# Patient Record
Sex: FEMALE | Race: WHITE | Employment: UNEMPLOYED | URBAN - METROPOLITAN AREA
[De-identification: names, ages, dates, MRNs, and addresses within clinical notes are randomized per-mention and may not be internally consistent; named-entity substitution may affect disease eponyms.]

---

## 2017-01-10 LAB
ANTIBODY SCREEN, EXTERNAL: NEGATIVE
CHLAMYDIA, EXTERNAL: NEGATIVE
HBSAG, EXTERNAL: NEGATIVE
HIV, EXTERNAL: NEGATIVE
N. GONORRHEA, EXTERNAL: NEGATIVE
RPR, EXTERNAL: NEGATIVE
RUBELLA, EXTERNAL: NORMAL
TYPE, ABO & RH, EXTERNAL: NORMAL

## 2017-07-06 LAB — GRBS, EXTERNAL: NEGATIVE

## 2017-08-08 ENCOUNTER — HOSPITAL ENCOUNTER (INPATIENT)
Age: 37
LOS: 2 days | Discharge: HOME | End: 2017-08-10
Attending: OBSTETRICS & GYNECOLOGY | Admitting: OBSTETRICS & GYNECOLOGY
Payer: COMMERCIAL

## 2017-08-08 PROBLEM — O09.523 ELDERLY MULTIGRAVIDA IN THIRD TRIMESTER: Status: ACTIVE | Noted: 2017-08-08

## 2017-08-08 LAB
ABO + RH BLD: NORMAL
BASOPHILS # BLD AUTO: 0 K/UL (ref 0–0.06)
BASOPHILS # BLD: 0 % (ref 0–2)
BLOOD GROUP ANTIBODIES SERPL: NORMAL
DIFFERENTIAL METHOD BLD: ABNORMAL
EOSINOPHIL # BLD: 0 K/UL (ref 0–0.4)
EOSINOPHIL NFR BLD: 0 % (ref 0–5)
ERYTHROCYTE [DISTWIDTH] IN BLOOD BY AUTOMATED COUNT: 13.8 % (ref 11.6–14.5)
HCT VFR BLD AUTO: 33.3 % (ref 35–45)
HGB BLD-MCNC: 11.2 G/DL (ref 12–16)
LYMPHOCYTES # BLD AUTO: 15 % (ref 21–52)
LYMPHOCYTES # BLD: 1.5 K/UL (ref 0.9–3.6)
MCH RBC QN AUTO: 29.7 PG (ref 24–34)
MCHC RBC AUTO-ENTMCNC: 33.6 G/DL (ref 31–37)
MCV RBC AUTO: 88.3 FL (ref 74–97)
MONOCYTES # BLD: 0.5 K/UL (ref 0.05–1.2)
MONOCYTES NFR BLD AUTO: 5 % (ref 3–10)
NEUTS SEG # BLD: 7.9 K/UL (ref 1.8–8)
NEUTS SEG NFR BLD AUTO: 80 % (ref 40–73)
PLATELET # BLD AUTO: 155 K/UL (ref 135–420)
PMV BLD AUTO: 13.6 FL (ref 9.2–11.8)
RBC # BLD AUTO: 3.77 M/UL (ref 4.2–5.3)
SPECIMEN EXP DATE BLD: NORMAL
WBC # BLD AUTO: 10 K/UL (ref 4.6–13.2)

## 2017-08-08 PROCEDURE — 74011250637 HC RX REV CODE- 250/637

## 2017-08-08 PROCEDURE — 0HQ9XZZ REPAIR PERINEUM SKIN, EXTERNAL APPROACH: ICD-10-PCS | Performed by: OBSTETRICS & GYNECOLOGY

## 2017-08-08 PROCEDURE — 10907ZC DRAINAGE OF AMNIOTIC FLUID, THERAPEUTIC FROM PRODUCTS OF CONCEPTION, VIA NATURAL OR ARTIFICIAL OPENING: ICD-10-PCS | Performed by: OBSTETRICS & GYNECOLOGY

## 2017-08-08 PROCEDURE — 86900 BLOOD TYPING SEROLOGIC ABO: CPT | Performed by: ADVANCED PRACTICE MIDWIFE

## 2017-08-08 PROCEDURE — 85025 COMPLETE CBC W/AUTO DIFF WBC: CPT | Performed by: ADVANCED PRACTICE MIDWIFE

## 2017-08-08 PROCEDURE — 74011000250 HC RX REV CODE- 250

## 2017-08-08 PROCEDURE — 65270000029 HC RM PRIVATE

## 2017-08-08 PROCEDURE — 74011250637 HC RX REV CODE- 250/637: Performed by: ADVANCED PRACTICE MIDWIFE

## 2017-08-08 RX ORDER — OXYTOCIN 10 [USP'U]/ML
INJECTION, SOLUTION INTRAMUSCULAR; INTRAVENOUS
Status: DISCONTINUED
Start: 2017-08-08 | End: 2017-08-08

## 2017-08-08 RX ORDER — SODIUM CHLORIDE 0.9 % (FLUSH) 0.9 %
5-10 SYRINGE (ML) INJECTION AS NEEDED
Status: DISCONTINUED | OUTPATIENT
Start: 2017-08-08 | End: 2017-08-08

## 2017-08-08 RX ORDER — IBUPROFEN 400 MG/1
800 TABLET ORAL
Status: DISCONTINUED | OUTPATIENT
Start: 2017-08-08 | End: 2017-08-10 | Stop reason: HOSPADM

## 2017-08-08 RX ORDER — MISOPROSTOL 200 UG/1
800 TABLET ORAL
Status: DISCONTINUED | OUTPATIENT
Start: 2017-08-08 | End: 2017-08-10 | Stop reason: HOSPADM

## 2017-08-08 RX ORDER — MISOPROSTOL 200 UG/1
TABLET ORAL
Status: DISCONTINUED
Start: 2017-08-08 | End: 2017-08-08

## 2017-08-08 RX ORDER — MISOPROSTOL 200 UG/1
800 TABLET ORAL
Status: DISCONTINUED | OUTPATIENT
Start: 2017-08-08 | End: 2017-08-08

## 2017-08-08 RX ORDER — PROMETHAZINE HYDROCHLORIDE 25 MG/ML
25 INJECTION, SOLUTION INTRAMUSCULAR; INTRAVENOUS
Status: DISCONTINUED | OUTPATIENT
Start: 2017-08-08 | End: 2017-08-10 | Stop reason: HOSPADM

## 2017-08-08 RX ORDER — ACETAMINOPHEN 325 MG/1
650 TABLET ORAL
Status: DISCONTINUED | OUTPATIENT
Start: 2017-08-08 | End: 2017-08-10 | Stop reason: HOSPADM

## 2017-08-08 RX ORDER — ACETAMINOPHEN 325 MG/1
325-650 TABLET ORAL
Status: DISCONTINUED | OUTPATIENT
Start: 2017-08-08 | End: 2017-08-08 | Stop reason: SDUPTHER

## 2017-08-08 RX ORDER — OXYTOCIN 10 [USP'U]/ML
10 INJECTION, SOLUTION INTRAMUSCULAR; INTRAVENOUS
Status: DISCONTINUED | OUTPATIENT
Start: 2017-08-08 | End: 2017-08-10 | Stop reason: HOSPADM

## 2017-08-08 RX ORDER — CASTOR OIL 100 %
OIL (ML) ORAL
Status: DISCONTINUED
Start: 2017-08-08 | End: 2017-08-08

## 2017-08-08 RX ORDER — OXYTOCIN 10 [USP'U]/ML
10 INJECTION, SOLUTION INTRAMUSCULAR; INTRAVENOUS
Status: DISCONTINUED | OUTPATIENT
Start: 2017-08-08 | End: 2017-08-08

## 2017-08-08 RX ORDER — LIDOCAINE HYDROCHLORIDE 10 MG/ML
INJECTION INFILTRATION; PERINEURAL
Status: DISCONTINUED
Start: 2017-08-08 | End: 2017-08-08

## 2017-08-08 RX ORDER — AMOXICILLIN 250 MG
1 CAPSULE ORAL
Status: DISCONTINUED | OUTPATIENT
Start: 2017-08-08 | End: 2017-08-10 | Stop reason: HOSPADM

## 2017-08-08 RX ORDER — SODIUM CHLORIDE 0.9 % (FLUSH) 0.9 %
5-10 SYRINGE (ML) INJECTION EVERY 8 HOURS
Status: DISCONTINUED | OUTPATIENT
Start: 2017-08-08 | End: 2017-08-08

## 2017-08-08 RX ORDER — LIDOCAINE HYDROCHLORIDE 10 MG/ML
20 INJECTION, SOLUTION EPIDURAL; INFILTRATION; INTRACAUDAL; PERINEURAL AS NEEDED
Status: DISCONTINUED | OUTPATIENT
Start: 2017-08-08 | End: 2017-08-08

## 2017-08-08 RX ORDER — CASTOR OIL 100 %
90 OIL (ML) ORAL ONCE
Status: DISCONTINUED | OUTPATIENT
Start: 2017-08-08 | End: 2017-08-08

## 2017-08-08 RX ORDER — ZOLPIDEM TARTRATE 5 MG/1
5 TABLET ORAL
Status: DISCONTINUED | OUTPATIENT
Start: 2017-08-08 | End: 2017-08-10 | Stop reason: HOSPADM

## 2017-08-08 RX ORDER — SENNOSIDES 8.6 MG/1
2 TABLET ORAL
Status: DISCONTINUED | OUTPATIENT
Start: 2017-08-08 | End: 2017-08-10 | Stop reason: HOSPADM

## 2017-08-08 RX ORDER — HYDROCORTISONE 10 MG/G
CREAM TOPICAL AS NEEDED
Status: DISCONTINUED | OUTPATIENT
Start: 2017-08-08 | End: 2017-08-08 | Stop reason: SDUPTHER

## 2017-08-08 RX ORDER — DOXYLAMINE SUCCINATE AND PYRIDOXINE HYDROCHLORIDE, DELAYED RELEASE TABLETS 10 MG/10 MG 10; 10 MG/1; MG/1
10 TABLET, DELAYED RELEASE ORAL
COMMUNITY

## 2017-08-08 RX ORDER — OXYCODONE AND ACETAMINOPHEN 5; 325 MG/1; MG/1
1-2 TABLET ORAL
Status: DISCONTINUED | OUTPATIENT
Start: 2017-08-08 | End: 2017-08-10 | Stop reason: HOSPADM

## 2017-08-08 RX ORDER — HYDROCORTISONE 25 MG/G
CREAM TOPICAL AS NEEDED
Status: DISCONTINUED | OUTPATIENT
Start: 2017-08-08 | End: 2017-08-10 | Stop reason: HOSPADM

## 2017-08-08 RX ADMIN — IBUPROFEN 800 MG: 400 TABLET ORAL at 15:50

## 2017-08-08 RX ADMIN — LIDOCAINE HYDROCHLORIDE 20 ML: 10 INJECTION, SOLUTION INFILTRATION; PERINEURAL at 14:59

## 2017-08-08 RX ADMIN — ACETAMINOPHEN 650 MG: 325 TABLET, FILM COATED ORAL at 21:03

## 2017-08-08 RX ADMIN — Medication 60 ML: at 14:50

## 2017-08-08 RX ADMIN — CASTOR OIL 60 ML: 100 LIQUID ORAL at 14:50

## 2017-08-08 NOTE — ROUTINE PROCESS
TRANSFER - IN REPORT:    Verbal report received from toño sanders rn(name) on Vero Ramon  being received from l rayo nicole(unit) for routine progression of care      Report consisted of patients Situation, Background, Assessment and   Recommendations(SBAR). Information from the following report(s) Kardex, Procedure Summary, Intake/Output, MAR and Recent Results was reviewed with the receiving nurse. Opportunity for questions and clarification was provided. Assessment completed upon patients arrival to unit and care assumed.

## 2017-08-08 NOTE — PROGRESS NOTES
1205: N. Syliva Shoe in room for cervical exam.  5/70/-3. Ultrasound brought into room to confirm baby vertex. Baby is in vertex position. 1305: Patient assisted into hands and knees with peanut ball.  at bedside for support. Room set for delivery. 1315: FHT are 144 via Doppler, Palpate strong contractions to every 2 min. 1357: N. Syliva Shoe in room for cervical exam and plan of care. 7/80/-2.     1358: Tub temp is 102.9, Ice buckets x 2 added to tub to cool it down. 1404: Tub temp is 100.1    1405: Patient in tub    1407: N. Syliva Shoe in room for labor. 1414: Doppler FHT is 140, movement audible on doppler. 1438: N. Syliva Shoe in room for delivery. 1440: Patient complete, pushing with Jayme De Leon CNM. 1442: Nursery called for delivery. 1444: Nursery arrived for delivery. 1452:  of VMI. Baby to maternal abdomen. Baby being tended to by nursery nurse. [phone]: Pediatrician called for  assistance. 1453: Cord clamped and cut.    1457: Spontaneous delivery of placenta. 1458: Perineum inspected by CNM. First degree laceration. CNM to repair. 1510: Radha-care done, Ice pack applied, Baby remains skin to skin for magic hour, side rails up and call light in reach, FOB at bedside. Patient instructed not to get for the first time without nurse at bedside and to call with increased bleeding. 1525: infant had dusky episode while adjusting mother for breastfeeding. Infant was crying at the time, Nursery called to bedside, observing infant while breastfeeding. 1540: Dietary called for tray. 1730: Pediatrician & Nursery RN at bedside discussing plan of care for infant. Patient wants to think about moving from midwifery to another room before moving.

## 2017-08-08 NOTE — PROGRESS NOTES
Patient ambulatory to unit with c/o contractions every 3 minutes since 1000 today. . 40 5/7 weeks. Denies leaking of vaginal fluids or bleeding. States positive fetal movement. Oriented to room and surroundings. Significant other and  Quentin Christian) supportive at bedside.

## 2017-08-08 NOTE — ROUTINE PROCESS
Bedside and Verbal shift change report given to Leoncio Santillan RN (oncoming nurse) by Greg Alberto RN (offgoing nurse). Report included the following information SBAR, Procedure Summary, Intake/Output, MAR and Recent Results.

## 2017-08-08 NOTE — L&D DELIVERY NOTE
Delivery Summary    Lacey Leyden in tub at 7 cms. Coping well but saying ' I can't do this\", about pain, moving in tub, out of tub. Asked where she wanted birth and she responded in bed. Assisted out of bed by RN and me,  with Lacey Leyden saying she couldn't move, and onto bed. SVE anterior lip. Reduced with 2 pushes. Pushed well, starting at 1440. FHR at 80, with recovery to 100. Only pushed another 3 pushes and baby born. Placed on mom's lower abdomen for stimulation since blue and limp. Short cord noted. Heart rate noted around 100. Cord clamped and cut immediately and handed to nursery nurse. Peds called. Baby with oxygen and stimulation. Dr Mauricio Bailey arrived baby with apgars of 2/5. Cord gases collected. Placenta delivered spont intact with 3 CV. EBL 300cc. First degree laceration repaired with 3-0 moncryl after lidocaine injected. Dr Ansley Portillo. [facility]    Patient: Clari Barraza MRN: [id number]  SSN: [id number]    YOB: 1980  Age: 40 y.o.   Sex: female        Labor Events:    Labor: No    Rupture Date: 2017    Rupture Time: 2:40 PM    Rupture Type AROM    Amniotic Fluid Volume:      Amniotic Fluid Description:         Induction: None        Augmentation: None;AROM    Labor Complications: None     Additional Complications:        Cervical Ripening:       None      Delivery Events:  Episiotomy: None    Laceration(s): First degree perineal      Repaired: Yes     Number of Repair Packets: 1    Suture Type and Size:    Monocryl 3.0 CT-1    Estimated Blood Loss (ml): 300        Information for the patient's :  Erasmo Eason [[id number]]     Delivery Summary - Baby    Delivery Date: 2017   Delivery Time: 2:52 PM   Delivery Type: Vaginal, Spontaneous Delivery  Sex:  male  Gestational Age: 39w6d  Delivery Clinician:  Nemo Malcolm  Living?: Living   Delivery Location: [address]  One minute Five minutes Ten minutes   Skin Color: 0    1       Heart Rate: 1 2         Reflex Irritability: 1   2         Muscle Tone: 0   2       Respiration: 0   2         Total: 2   9           Presentation: Vertex  Position: Right Occiput Anterior  Resuscitation Method:  Suctioning-tracheal;Tactile Stimulation; Oxygen     Meconium Stained: None    Cord Information: 3 Vessels   Complications: None  Cord Blood Sent?:  Yes    Blood Gases Sent?:  Yes    Placenta:  Date/Time:   2:57 PM  Removal: Spontaneous      Appearance: Normal     [place] Measurements:  Birth Weight:      Birth Length:     Head Circumference:       Chest Circumference:      Abdominal Girth:       Other Providers:   Gage BROWN;LUCAS CODY;AVIS QUIROZ;MARIALUISA BAHENA Midwife;Primary Nurse;Primary [place] Nurse;Pediatrician           Cord Blood Results:  Information for the patient's :  Ginna Thomson [[id number]]   No results found for: ABORH, PCTABR, PCTDIG, BILI, ABORHEXT, ABORH    Information for the patient's :  Ginna Thomson [[id number]]   No results found for: APH, APCO2, APO2, AHCO3, ABEC, ABDC, O2ST, SITE, [place], PHI, [place], PO2I, HCO3I, SO2I, IBD     Information for the patient's :  Ginna Thomson [[id number]]   No results found for: EPHV, PCO2V, PO2V, HCO3V, O2STV, EBDV

## 2017-08-08 NOTE — PROGRESS NOTES
Labor Progress Note  Patient seen, fetal heart rate and contraction pattern evaluated, patient examined. Lab Results  Component Value Date/Time   WBC 10.0 08/08/2017 12:42 PM   HGB 11.2 08/08/2017 12:42 PM   HCT 33.3 08/08/2017 12:42 PM   PLATELET 755 09/42/1542 12:42 PM   MCV 88.3 08/08/2017 12:42 PM         Physical Exam:  Cervical Exam:  7 cm dilated    80% effaced    -2 station    Membranes:  Intact  Uterine Activity: Frequency: Every 3 minutes  Fetal Heart Rate: Baseline: 150 per minute    Assessment/Plan:  Reassuring fetal status   Head with good descent.

## 2017-08-08 NOTE — H&P
Obstetric Admission History and Physical    Name: Lima Lr MRN: [id number] SSN: [id number]    YOB: 1980  Age: 40 y.o. Sex: female       Subjective:      Chief complaint:  contractions    Dede Carnes is a 40 y.o.  female, G 2 P 1 who presents at 40.5 weeks gestation with contractions. On admission EFM strip is obtained and is Category 1. She reports onset of symptoms at 0300. OB HISTORY  Prenatal care started at 11 wks with [address] . TVS supporting dates and viability. Problems of pregnancy include AMA- no testing performed . Total wt gain 18 lbs. GBS neg. PAST PREGNANCY HISTORY   41 wks 10 hrs F    [facility]    PAST MEDICAL / SURGICAL HISTORY  No past medical history on file. Problem List as of 2017  Date Reviewed: 2017          Codes Class Noted - Resolved    Elderly multigravida in third trimester ICD-10-CM: O09.523  ICD-9-CM: 659.63  2017 - Present        * (Principal)Labor and delivery indication for care or intervention ICD-10-CM: O75.9  ICD-9-CM: 659.90  2017 - Present        RESOLVED: Postpartum care following vaginal delivery ICD-10-CM: Z39.2  ICD-9-CM: V24.2  2014 - 2017              FAMILY/ SOCIAL HISTORY  Social History     Social History    Marital status:      Spouse name: N/A    Number of children: N/A    Years of education: N/A     Occupational History    Not on file. Social History Main Topics    Smoking status: Never Smoker    Smokeless tobacco: Not on file    Alcohol use No    Drug use: No    Sexual activity: Yes     Partners: Male     Other Topics Concern    Not on file     Social History Narrative          ALLERGY:  Allergies   Allergen Reactions    Milk Containing Products Diarrhea and Nausea and Vomiting    Onion Nausea and Vomiting       Review of Systems:  A comprehensive review of systems was negative      Objective:     VITAL SIGNS:  There were no vitals taken for this visit.     Physical Exam:  Abdomen: soft  Position of baby vtx  Estimated fetal weight 7 lbs  Contractions q 3-4 mins/mod quality  FHR baseline at  140 bpm/ variability mod/Category 1/accels  External Genitalia: normal general appearance without lesions  Cervix: Dilation: 5cm/ 70%/-3. Verified by sono as vertex  Membranes  intact    Assessment:     1) 40.5  weeks Intrauterine Pregnancy . 2) labor management      Plan:     Reassuring fetal status, Labor  Progressing normally, Continue plan for vaginal delivery   Head in pelvis but _3/ verified by sono as vertex.      Dr Jerri Slater MD  notified and aware of admission    Signed By:  Angie Payton CNM     August 8, 2017

## 2017-08-08 NOTE — IP AVS SNAPSHOT
Dionnaaiden Moreno 
 
 
 [address] [place] [address] Patient: Hayley Lomeli MRN: [id number] You are allergic to the following Allergen Reactions Milk Containing Products Diarrhea Nausea and Vomiting Onion Nausea and Vomiting Immunizations Administered for This Admission Name Date Tdap  Deferred (),  Deferred () Recent Documentation Height Weight Breastfeeding? BMI OB Status Smoking Status 1.6 m 83 kg Unknown 32.41 kg/m2 Recent pregnancy Never Smoker Emergency Contacts Name Discharge Info Relation Home Work Mobile [address] CAREGIVER [3] Spouse [3]   [phone] About your hospitalization You were admitted on:  August 8, 2017 You last received care in the:  [place] [address] You were discharged on:  August 10, 2017 Unit phone number:  [phone] Why you were hospitalized Your primary diagnosis was:  Labor And Delivery Indication For Care Or Intervention Your diagnoses also included:  Elderly Multigravida In Third Trimester Providers Seen During Your Hospitalizations Provider Role Specialty Primary office phone Barbarann Cheadle, MD Attending Provider Obstetrics & Gynecology [phone] Your Primary Care Physician (PCP) Primary Care Physician Office Phone Office Fax [facility] [phone] Follow-up Information Follow up With Details Comments Contact Info Amadeo Subramanian MD   [address] [place] [address] 83 [phone] Current Discharge Medication List  
  
CONTINUE these medications which have NOT CHANGED Dose & Instructions Dispensing Information Comments Morning Noon Evening Bedtime DICLEGIS 10-10 mg Tbec Generic drug:  doxylamine-pyridoxine Your last dose was:     
   
Your next dose is:    
   
   
 Dose:  10 mg  
 Take 10 mg by mouth nightly. Refills:  0  
     
   
   
   
  
 ferrous sulfate 325 mg (65 mg iron) tablet Commonly known as:  Iron Your last dose was: Your next dose is:    
   
   
 Dose:  325 mg Take 1 Tab by mouth Daily (before breakfast). Quantity:  30 Tab Refills:  1 PNV No.40-Iron Fum-FA Cmb No.1 27-1 mg Tab Your last dose was: Your next dose is: Take  by mouth. Refills:  0  
     
   
   
   
  
 senna-docusate 8.6-50 mg per tablet Commonly known as:  Everrett Jamison Your last dose was: Your next dose is:    
   
   
 Dose:  1 Tab Take 1 Tab by mouth two (2) times daily as needed for Constipation. Quantity:  30 Tab Refills:  0 Discharge Instructions CONGRATULATIONS ON THE BIRTH OF YOUR BABY! The first six weeks after childbirth is a time of physical and emotional adjustment. This handout will help to answer questions and provide guidance during the postpartum period. Every family's adjustment is unique, so please call if you have further concerns. At anytime we can be reached at [phone]. During office hours please ask to speak to a charge nurse. After hours, the answering service will take a message and the Nurse-Midwife on-call will return your call. If your question can wait until office hours: Monday-Friday 8:30-4:00, please do so. For emergencies or urgent concerns do not hesitate to call us after hours. DIET Your body is in need of a well-balanced, high protein diet to recuperate from birth. Please continue to take your prenatal vitamins for 6 weeks or as long as you are breastfeeding. Continue to drink at least 6-8 cups of water or other liquid a day. A breastfeeding mother also needs extra protein, calories and calcium containing foods.   It is a good rule to drink fluids with every feeding in order to maintain an adequate milk supply and avoid dehydration. Your baby will probably not be bothered by things in your diet, but if the baby seems extremely fussy or develops a rash, you may want to discuss possible food intolerances with your baby's care provider. PAIN MEDICATIONS Acetaminophen (Tylenol), ibuprofen (Motrin), or other prescribed pain medication may be taken as directed to relieve discomfort. The above medications pass in very minimal amounts into the breast milk and usually will not cause problems. There are medications that may affect the baby, so please consult your baby's care provider before taking medication. If you are breastfeeding, be sure to mention this to any care provider you see so that medications that are safe may be selected. There is an excellent resource called [employer] that is a resource for medication safety in pregnancy and lactation. You can visit their website at [employer]/ or call them toll free at [phone] if you have any questions about medication safety. UTERINE INVOLUTION / VAGINAL BLEEDING Involution is the process of the uterus returning to pre-pregnant size. It will take approximately six weeks for this process to occur. To achieve this size your uterus becomes firm to slow bleeding loss from the placental site. The first 7 days after birth, the bleeding is red and heavy. It may change with your activity and position. Some small clots are normal.   After ten days, the bleeding should be pale pink and slowed considerably. The next several weeks may progress to a pink, mucousy discharge. This may continue for 6-8 weeks, depending on your activity. During the first four weeks after delivery we recommend using sanitary pads instead of tampons. Douching should also be avoided, but it is fine to take a tub bath so long as the tub is very clean.  
 
 
ACTIVITY/EXERCISE 
 Adequate rest is essential to recovery. Try to rest or sleep when the baby sleeps. After two weeks, you may begin going for short walks, doing Kegel exercises and abdominal crunches. Avoid heavy, jarring or aerobic exercises. Remember to start out slowly and build up to your previous fitness level. Use common sense and don't overdo as rest is important and the benefits of increased rest are a quicker recovery. For the first two weeks after a  try to limit trips up or down steps. Do not lift anything heavier than the baby during this time. Lifting the baby or other objects should be done by bending at the knees rather than the waist.  Driving should be avoided during the first two to three weeks until you have the strength to push firmly on the brakes in case of an emergency. You may ride as a passenger, but DO wear a seat belt at all times. After a few weeks, you may resume normal activity at whatever pace is comfortable for you. Exercise may also be resumed gradually. Walking is a good way to start. Finally, try to be reasonable in your expectations. Caring for a new baby after major surgery can be quite trying. Arrange for assistance at home to ensure that you get enough rest.  
 
POSTPARTUM CHECK You may call the office when you return home to set up a postpartum visit. Most patients will be seen at 6 weeks after delivery, but after a  or other circumstances you may be seen in 2 weeks or less. If you are discharged from the hospital with staples that must be removed, you will be asked to come in sooner. At your postpartum visit, a pelvic exam may be performed. If you are having any problems or concerns, please do not hesitate to call. Once again our number is [phone]. MOOD CHANGES Significant hormonal changes occur in the days following delivery, and as a result, many women experience brief episodes of tearfulness or feeling \"blue. \"  These emotional swings may be made worse by lack of sleep and by the adjustments inherent in becoming a mother. For some women, these fluctuations are minor. For others, they are overwhelming; creating feelings of anxiety, depression, or the inability to cope. If you have difficulty functioning as a result of feeling down, or if the mood changes seem severe, do not improve, or result is thoughts of harming yourself or others CALL RIGHT AWAY. PERINEAL CARE The basic goals of perineal care are to prevent infection, to relieve pain and promote healing. Your stitches will dissolve in four to six weeks, and do not need to be removed. After urinating, please continue to clean with warm water from front to back. Please continue sitz baths as instructed twice a day for a week or as needed. Call the office if you see pus in the suture site, or have unusual or severe swelling or pain that seems to be getting worse. INCISION CARE If you had a , clean and dry the incision gently as you would the rest of your body. Washing over the area with soap and water, and showering are fine. If steri-strips are present they will gradually come off with time. Tub baths are permitted. You may experience numbness and burning in the area surrounding the incision which usually resolves gradually over the next several weeks or months. RETURN OF MENSTRUATION Your first menstrual period may occur as soon as four to six weeks after your delivery if you are not breast-feeding. If breast-feeding it is more difficult to predict when your first period will occur. Even if you are not yet menstruating, you may be ovulating and it may be possible to conceive again. It is common for your first period after childbirth to be very heavy with an increased amount of cramping. BREASTS Breast-feeding Mothers: Colostrum is excreted in the first 24-72 hours. Mature breast milk will appear on the 2nd to 5th day. Engorgement may occur with the mature milk making your breasts feel warm and very full. Frequent feedings will make you more comfortable. Babies do not nurse on regular schedules. Nursing every 1 1/2 to 2 hours is normal and frequent feeding DOES NOT mean you are not making enough milk. To avoid nipple confusion, do not give bottles for the first 4 weeks. Growth spurts are common and may require more frequent feedings. This is the way baby increases your milk supply. During a growth spurt, you may feel you are feeding very frequently and that your breasts are \"empty. \"  Don't worry, your milk is produced by supply and demand so this increased frequency of feeding will increase your milk supply within 48 hours. Sore nipples may occur with frequent feedings and are sometimes also caused by improper latch. Check for a proper latch. Baby should have a wide open mouth. Use different positions at each feeding if possible. Express a small amount of colostrum or breast milk onto the sore area and leave bra flaps unlatched until dry. The lactation consultant at [facility] is available for outpatient consultation without charge. Call [phone] from Monday-Friday 9:00am- 3:00pm to arrange an outpatient appointment with her. Local [facility] and consultants may also be very helpful. If You Are Not Breast-feeding: You will experience swelling, engorgement and some milk production. There are no safe medications available to stop lactation. Some remedies for engorgement include: wearing a tight bra, ice packs and cold green cabbage leaves placed between the breast and your bra. Change these frequently. Tylenol or Motrin should help with the discomfort. SEXUAL ADJUSTMENTS We recommend that you wait at least four weeks before resuming sexual intercourse.   A sore perineum, a demanding baby and fatigue will certainly affect your ability to enjoy lovemaking! A vaginal lubricant is recommended to help with any dryness. It is very important to remember that you will ovulate BEFORE your first period and can conceive. If you do not wish another pregnancy right away, please take precautions to avoid pregnancy. If you would like a prescription method of birth control, please discuss this with us at your 6 week visit. ELIMINATION We remind all postpartum patients that it may take a few days for your bowels to return to normal, especially if you had a long labor. For those who had C-sections or severe lacerations, we recommend that you use a stool softener twice daily for at least two weeks. Many stool softeners are over-the-counter. Colace (Docusate Sodium) is recommended. Bulk forming agents such as Metamucil or Fibercon may be used daily in addition to a stool softener to promote regular bowel movements. Eating fresh fruits and vegetables along with whole grains is helpful as well. Do not be afraid to have a bowel movement as your stitches will not \"come out\" in the course of having a bowel movement. Urination may be difficult due to soreness around the urethra, or as an after effect of epidural.  This is temporary and can be helped  by squirting water over the perineum or try going in the shower. Hemorrhoids are common after birth. Tucks pads, Anusol cream and avoiding constipation are helpful. If constipation does occur, you may take Milk of Magnesia or Senekot according to the package instructions. DANGER SIGNS! CALL WITHOUT DELAY IF YOU ARE EXPERIENCING ANY OF THE FOLLOWING: 
* Unusually heavy bleeding, soaking more than 1 or more pads in an hour. * Vaginal discharge with strong foul odor. * Fever of 101 or higher * Unusual pain or tenderness in the abdominal area. * If breasts are red, hot or have a painful lump. * Depression that persists longer than 1-2 weeks or is severe. * Any urinary frequency accompanied by urgency or pain. * A lump in leg or calf especially if painful, warm or red. We thank you for choosing us for your prenatal care and/or delivery. We wish you all happiness and health with your baby for his or her lifetime! Bud Lopez MD 
 
Discharge Instructions Attachments/References SAFE SLEEP AND SUDDEN INFANT DEATH SYNDROME (SIDS): PEDIATRIC: GENERAL INFO (ENGLISH) SHAKEN BABY SYNDROME: PEDIATRIC (ENGLISH) DEPRESSION: POSTPARTUM (ENGLISH) PARENTING: STRESS AND INFANTS (ENGLISH) Discharge Orders None [facility] Announce[facility] We are excited to announce that we are making your provider's discharge notes available to you in [employer]. You will see these notes when they are completed and signed by the physician that discharged you from your recent hospital stay. If you have any questions or concerns about any information you see in [employer], please call the Health Information Department where you were seen or reach out to your Primary Care Provider for more information about your plan of care. Introducing [facility]! Philip Ba introduces [employer] patient portal. Now you can access parts of your medical record, email your doctor's office, and request medication refills online. 1. In your internet browser, go to [email or web address] 2. Click on the First Time User? Click Here link in the Sign In box. You will see the New Member Sign Up page. 3. Enter your [employer] Access Code exactly as it appears below. You will not need to use this code after youve completed the sign-up process. If you do not sign up before the expiration date, you must request a new code. · [employer] Access Code: [email or web address] Expires: 10/29/2017  9:57 AM 
 
4. Enter the last four digits of your Social Security Number (xxxx) and Date of Birth (mm/dd/yyyy) as indicated and click Submit. You will be taken to the next sign-up page. 5. Create a [employer] ID. This will be your [employer] login ID and cannot be changed, so think of one that is secure and easy to remember. 6. Create a [employer] password. You can change your password at any time. 7. Enter your Password Reset Question and Answer. This can be used at a later time if you forget your password. 8. Enter your e-mail address. You will receive e-mail notification when new information is available in [address] 9. Click Sign Up. You can now view and download portions of your medical record. 10. Click the Download Summary menu link to download a portable copy of your medical information. If you have questions, please visit the Frequently Asked Questions section of the [employer] website. Remember, [employer] is NOT to be used for urgent needs. For medical emergencies, dial 911. Now available from your iPhone and Android! General Information Please provide this summary of care documentation to your next provider. Patient Signature:  ____________________________________________________________ Date:  ____________________________________________________________  
  
Mauricio Duarte Provider Signature:  ____________________________________________________________ Date:  ____________________________________________________________ More Information Learning About Safe Sleep for Babies Why is safe sleep important? Enjoy your time with your baby, and know that you can do a few things to keep your baby safe. Following safe sleep guidelines can help prevent sudden infant death syndrome (SIDS) and reduce other sleep-related risks. SIDS is the death of a baby younger than 1 year with no known cause. Talk about these safety steps with your  providers, family, friends, and anyone else who spends time with your baby. Explain in detail what you expect them to do.  Do not assume that people who care for your baby know these guidelines. What are the tips for safe sleep? Putting your baby to sleep · Put your baby to sleep on his or her back, not on the side or tummy. This reduces the risk of SIDS. · Once your baby learns to roll from the back to the belly, you do not need to keep shifting your baby onto his or her back. But keep putting your baby down to sleep on his or her back. · Keep the room at a comfortable temperature so that your baby can sleep in lightweight clothes without a blanket. Usually, the temperature is about right if an adult can wear a long-sleeved T-shirt and pants without feeling cold. Make sure that your baby doesn't get too warm. Your baby is likely too warm if he or she sweats or tosses and turns a lot. · Consider offering your baby a pacifier at nap time and bedtime if your doctor agrees. · The American Academy of Pediatrics recommends that you do not sleep with your baby in the bed with you. · When your baby is awake and someone is watching, allow your baby to spend some time on his or her belly. This helps your baby get strong and may help prevent flat spots on the back of the head. Cribs, cradles, bassinets, and bedding · For the first 6 months, have your baby sleep in a crib, cradle, or bassinet in the same room where you sleep. · Keep soft items and loose bedding out of the crib. Items such as blankets, stuffed animals, toys, and pillows could block your baby's mouth or trap your baby. Dress your baby in sleepers instead of using blankets. · Make sure that your baby's crib has a firm mattress (with a fitted sheet). Don't use bumper pads or other products that attach to crib slats or sides. They could block your baby's mouth or trap your baby. · Do not place your baby in a car seat, sling, swing, bouncer, or stroller to sleep. The safest place for a baby is in a crib, cradle, or bassinet that meets safety standards. What else is important to know? More about sudden infant death syndrome (SIDS) SIDS is very rare. In most cases, a parent or other caregiver puts the babywho seems healthydown to sleep and returns later to find that the baby has . No one is at fault when a baby dies of SIDS. A SIDS death cannot be predicted, and in many cases it cannot be prevented. Doctors do not know what causes SIDS. It seems to happen more often in premature and low-birth-weight babies. It also is seen more often in babies whose mothers did not get medical care during the pregnancy and in babies whose mothers smoke. Do not smoke or let anyone else smoke in the house or around your baby. Exposure to smoke increases the risk of SIDS. If you need help quitting, talk to your doctor about stop-smoking programs and medicines. These can increase your chances of quitting for good. Breastfeeding your child may help prevent SIDS. Be wary of products that are billed as helping prevent SIDS. Talk to your doctor before buying any product that claims to reduce SIDS risk. What to do while still pregnant · See your doctor regularly. Women who see a doctor early in and throughout their pregnancies are less likely to have babies who die of SIDS. · Eat a healthy, balanced diet, which can help prevent a premature baby or a baby with a low birth weight. · Do not smoke or let anyone else smoke in the house or around you. Smoking or exposure to smoke during pregnancy increases the risk of SIDS. If you need help quitting, talk to your doctor about stop-smoking programs and medicines. These can increase your chances of quitting for good. · Do not drink alcohol or take illegal drugs. Alcohol or drug use may cause your baby to be born early. Follow-up care is a key part of your child's treatment and safety. Be sure to make and go to all appointments, and call your doctor if your child is having problems.  It's also a good idea to know your child's test results and keep a list of the medicines your child takes. Where can you learn more? Go to http://ronald-jossue.info/. Enter P621 in the search box to learn more about \"Learning About Safe Sleep for Babies. \" Current as of: May 4, 2017 Content Version: 11.3 © 6584-1757 [employer]. Care instructions adapted under license by [employer] (which disclaims liability or warranty for this information). If you have questions about a medical condition or this instruction, always ask your healthcare professional. [place] [address] any warranty or liability for your use of this information. Shaken Baby Syndrome: Care Instructions Your Care Instructions If you want to save this information but don't think it is safe to take it home, see if a trusted friend can keep it for you. Plan ahead. Know who you can call for help, and memorize the phone number. Be careful online too. Your online activity may be seen by others. Do not use your personal computer or device to read about this topic. Use a safe computer such as one at work, a friend's house, or a [employer]. There is a big difference between normal play activities and violent movements that harm a child. Bouncing a child on a knee or gently tossing a child in the air does not cause shaken baby syndrome. Shaken baby syndrome is brain damage that occurs when a baby is shaken or is slammed or thrown against an object. It is a form of child abuse that occurs when the baby's caregiver loses control. Shaking a baby or striking a baby's head can cause bruising and bleeding to the brain. Caring for a baby can be trying at times. You may have periods of feeling overwhelmed, especially if your baby is crying. Many babies cry from 1 to 5 hours out of every 24 hours during the first few months of life. Some babies cry more.  You can learn ways to help stay in control of your emotions when you feel stressed. Then you can be with your baby in a loving and healthy way. Follow-up care is a key part of your child's treatment and safety. Be sure to make and go to all appointments, and call your doctor if your child is having problems. It's also a good idea to know your child's test results and keep a list of the medicines your child takes. How can you care for your child at home? · Take steps to protect yourself from being stressed. ¨ Learn about how children develop so that you will understand why your child behaves as he or she does. Talk to your doctor about parent education classes or books. ¨ Talk with other parents about the ways they cope with the demands of parenting. ¨ Ask for help when you need time for yourself. ¨ Take short breaks and naps whenever you can. · If your baby cries a lot, try these ways to take care of his or her needs or to remove yourself safely. ¨ Check to see if your baby is hungry or has a dirty diaper. ¨ Hold your baby to your chest while you take and release deep breaths. ¨ Swing, rock, or walk with your baby. Some babies love to be taken for car rides or stroller walks. ¨ Tell stories and sing songs to your baby, who loves to hear your voice. ¨ Let your baby cry alone for a few minutes if his or her needs are taken care of and he or she is in a safe place, such as a crib. Remove yourself to another room where you can breathe calmly and try to clear your head. Count to 10 with each breath. ¨ Talk to your doctor if your baby continues to cry for what seems to be no reason. · Try some steps for relieving stress in your life. There are self-help books and classes on yoga, relaxation techniques, and other ways to relieve stress. Counseling and anger management training help many parents adjust to new pressures. · Never shake a baby. Never slap or hit a baby. · Take steps to protect your child from abuse by others. ¨ Screen your potential  providers to find out their backgrounds and attitudes about . ¨ If you suspect child abuse and the child is not in immediate danger, contact your local child protection services or police. ¨ Do not confront someone who you suspect is a child abuser. This may cause more harm to the child. ¨ If you are concerned about a child's well-being, call the [facility] hotline at 8-974-1-A-CHILD (0-618.168.2357). When should you call for help? Call 911 anytime you think a child may need emergency care. For example, call if: · A child is unconscious or is having trouble breathing. · A baby has been shaken. It is extremely important that a shaken baby gets medical care right away. Call your doctor now or seek immediate medical care if: 
· You are concerned that you cannot control your actions around your child. · You are concerned that a child's caregiver cannot control his or her actions around a child. Watch closely for changes in your child's health, and be sure to contact your doctor if your child has any problems. Where can you learn more? Go to http://ronald[place]terling Heights Dentistjossue.info/. Enter H891 in the search box to learn more about \"Shaken Baby Syndrome: Care Instructions. \" Current as of: July 26, 2016 Content Version: 11.3 © 0631-3716 "[employer]. Care instructions adapted under license by [employer] (which disclaims liability or warranty for this information). If you have questions about a medical condition or this instruction, always ask your healthcare professional. [place] [address] any warranty or liability for your use of this information. Depression After Childbirth: Care Instructions Your Care Instructions Many women get the \"baby blues\" during the first few days after childbirth. You may lose sleep, feel irritable, and cry easily.  You may feel happy one minute and sad the next. Hormone changes are one cause of these emotional changes. Also, the demands of a new baby, along with visits from relatives or other family needs, add to a mother's stress. The \"baby blues\" often peak around the fourth day. Then they ease up in less than 2 weeks. If your moodiness or anxiety lasts for more than 2 weeks, or if you feel like life is not worth living, you may have postpartum depression. This is different for each mother. Some mothers with serious depression may worry intensely about their infant's well-being. Others may feel distant from their child. Some mothers might even feel that they might harm their baby. A mother may have signs of paranoia, wondering if someone is watching her. Depression is not a sign of weakness. It is a medical condition that requires treatment. Medicine and counseling often work well to reduce depression. Talk to your doctor about taking antidepressant medicine while breastfeeding. Follow-up care is a key part of your treatment and safety. Be sure to make and go to all appointments, and call your doctor if you are having problems. It's also a good idea to know your test results and keep a list of the medicines you take. How do you know if you are depressed? With all the changes in your life, you may not know if you are depressed. Pregnancy sometimes causes changes in how you feel that are similar to the symptoms of depression. Symptoms of depression include: · Feeling sad or hopeless and losing interest in daily activities. These are the most common symptoms of depression. · Sleeping too much or not enough. · Feeling tired. You may feel as if you have no energy. · Eating too much or too little. · Writing or talking about death, such as writing suicide notes or talking about guns, knives, or pills.  Keep the numbers for these national suicide hotlines: 2-882-309-TALK (8-135.605.4646) and 9-602-NLXMGLM (3-351.265.8199). If you or someone you know talks about suicide or feeling hopeless, get help right away. How can you care for yourself at home? · Be safe with medicines. Take your medicines exactly as prescribed. Call your doctor if you think you are having a problem with your medicine. · Eat a healthy diet so that you can keep up your energy. · Get regular daily exercise, such as walks, to help improve your mood. · Get as much sunlight as possible. Keep your shades and curtains open. Get outside as much as you can. · Avoid using alcohol or other substances to feel better. · Get as much rest and sleep as possible. Avoid doing too much. Being too tired can increase depression. · Play stimulating music throughout your day and soothing music at night. · Schedule outings and visits with friends and family. Ask them to call you regularly, so that you do not feel alone. · Ask for help with preparing food and other daily tasks. Family and friends are often happy to help a mother with a . · Be honest with yourself and those who care about you. Tell them about your struggle. · Join a support group of new mothers. No one can better understand the challenges of caring for a  than other new mothers. · If you feel like life is not worth living or are feeling hopeless, get help right away. Keep the numbers for these national suicide hotlines: 4-587-942-TALK (8-605.433.2317) and 3-076-BPHTOPG (5-606.305.6162). When should you call for help? Call 911 anytime you think you may need emergency care. For example, call if: 
· You feel you cannot stop from hurting yourself, your baby, or someone else. Call your doctor now or seek immediate medical care if: 
· You are having trouble caring for yourself or your baby. · You hear voices. Watch closely for changes in your health, and be sure to contact your doctor if: 
· You have problems with your depression medicine. · You do not get better as expected. Where can you learn more? Go to http://ronald-jossue.info/. Enter U447 in the search box to learn more about \"Depression After Childbirth: Care Instructions. \" Current as of: July 26, 2016 Content Version: 11.3 © 3575-1574 [employer]. Care instructions adapted under license by [employer] (which disclaims liability or warranty for this information). If you have questions about a medical condition or this instruction, always ask your healthcare professional. [address] any warranty or liability for your use of this information. Stress in Parents of Infants: Care Instructions Your Care Instructions Meeting the increased demands of being a new parent can be a big challenge. It is easy to get overtired and overwhelmed during the first weeks. What used to be a simple chore, such as buying groceries, is not so simple now. Plus, you have new chores, including feeding and changing your new baby. At the end of the day, you may be so tired that you feel like crying. Instead of looking forward to the next day, you may be dreading tomorrow. Like many new parents, you are burned out from the stress of having a new baby. Stress affects each of us differently, and the most effective ways to relieve it are different for each person. You can try different methods to find out which ones work best for you. As the weeks go by, you will begin to develop a rhythm with your baby. Tasks that now seem to take forever will become easier. Many women get the \"baby blues\" during the first few days after childbirth. If you are a new mother and the \"baby blues\" last more than a few days, call your doctor right away. Depression is a medical condition that requires treatment. Follow-up care is a key part of your treatment and safety.  Be sure to make and go to all appointments, and call your doctor if you are having problems. It's also a good idea to know your test results and keep a list of the medicines you take. How can you care for yourself at home? · Be kind to yourself. Your new baby takes a lot of work, but he or she can give you a lot of pleasure too. Do not worry about housekeeping for a while. · Allow your friends to bring you meals or do chores. · Limit visitors to as few as you feel you can handle, or ask them not to visit for a while. Before they come, set a limit on how long they will stay. · Sleep when your baby sleeps. Even a short nap helps. · Find what triggers your stress, and avoid those things as much as you can. · If you breastfeed, learn how to collect and store some breast milk so your partner or  can feed the baby while you sleep. · Eat a balanced diet so you can keep up your energy. · Drink plenty of fluids throughout the day. · Avoid caffeine and alcohol. Caffeine is found in coffee, tea, cola drinks, chocolate, and other foods. · Limit medicines that can make you more tired, such as tranquilizers and cold and allergy medicines. · Get regular daily exercise, such as walks, to help improve your mood. Rest after you exercise. · Be honest with yourself and those who care about you. Tell them you are stressed and tired. · Talking to other new parents can help. Ask your doctor or child's doctor to suggest support groups for new parents. Hearing that someone else is having the same experiences you are can help a lot. · If you have the baby blues for more than a few days, call your doctor right away. When should you call for help? Call 911 anytime you think you may need emergency care. For example, call if: 
· You have thoughts of hurting yourself, your baby, or another person. Call your doctor now or seek immediate medical care if: 
· You are having trouble caring for yourself or your baby.  
Watch closely for changes in your health, and be sure to contact your doctor if you have any problems. Where can you learn more? Go to http://ronald-jossue.info/. Enter H142 in the search box to learn more about \"Stress in Parents of Infants: Care Instructions. \" Current as of: July 26, 2016 Content Version: 11.3 © 8625-5995 [employer]. Care instructions adapted under license by [employer]_ (which disclaims liability or warranty for this information). If you have questions about a medical condition or this instruction, always ask your healthcare professional. [address] any warranty or liability for your use of this information.

## 2017-08-09 LAB
HCT VFR BLD AUTO: 31.7 % (ref 35–45)
HGB BLD-MCNC: 10.5 G/DL (ref 12–16)

## 2017-08-09 PROCEDURE — 74011250637 HC RX REV CODE- 250/637: Performed by: ADVANCED PRACTICE MIDWIFE

## 2017-08-09 PROCEDURE — 85018 HEMOGLOBIN: CPT | Performed by: ADVANCED PRACTICE MIDWIFE

## 2017-08-09 PROCEDURE — 65270000029 HC RM PRIVATE

## 2017-08-09 PROCEDURE — 36415 COLL VENOUS BLD VENIPUNCTURE: CPT | Performed by: ADVANCED PRACTICE MIDWIFE

## 2017-08-09 RX ADMIN — IBUPROFEN 800 MG: 400 TABLET ORAL at 08:57

## 2017-08-09 RX ADMIN — IBUPROFEN 800 MG: 400 TABLET ORAL at 16:57

## 2017-08-09 RX ADMIN — ACETAMINOPHEN 650 MG: 325 TABLET, FILM COATED ORAL at 06:19

## 2017-08-09 RX ADMIN — ACETAMINOPHEN 650 MG: 325 TABLET, FILM COATED ORAL at 16:05

## 2017-08-09 RX ADMIN — ACETAMINOPHEN 650 MG: 325 TABLET, FILM COATED ORAL at 02:27

## 2017-08-09 RX ADMIN — ACETAMINOPHEN 650 MG: 325 TABLET, FILM COATED ORAL at 12:05

## 2017-08-09 RX ADMIN — IBUPROFEN 800 MG: 400 TABLET ORAL at 00:05

## 2017-08-09 RX ADMIN — ACETAMINOPHEN 650 MG: 325 TABLET, FILM COATED ORAL at 20:08

## 2017-08-09 NOTE — PROGRESS NOTES
Mom doing well VS WNL up and moving around, tolerating diet well. Mom continuing to do well pain managed well with medicine, bonding with baby.

## 2017-08-09 NOTE — PROGRESS NOTES
Patient doing well. Ambulating without complaints. Voiding without problems. Pain managed well with motrin and tylenol. Bonding well with baby.

## 2017-08-09 NOTE — ROUTINE PROCESS
Verbal shift change report given to Delia Garcia RN (oncoming nurse) by Alphonso Johnson RN (offgoing nurse). Report included the following information SBAR, Kardex, Intake/Output, MAR and Recent Results.

## 2017-08-09 NOTE — PROGRESS NOTES
Verbal shift change report given to Zhane Hernandez RN (oncoming nurse) by Hermes Gee (offgoing nurse). Report included the following information SBAR, Kardex, Intake/Output, MAR and Recent Results.

## 2017-08-09 NOTE — PROGRESS NOTES
PPD # 1    Patient doing well post-partum without significant complaint. Voiding without difficulty, normal lochia. Breastfeeding well. Baby stable but spent the night in nursery to observe for apneic spells    Vitals:  No data found. Temp (24hrs), Av.9 °F (36.6 °C), Min:97.3 °F (36.3 °C), Max:98.4 °F (36.9 °C)      Vital signs stable, afebrile. Exam:  Patient without distress. Breasts intact and nontender               Abdomen soft, fundus firm at level of umbilicus, nontender               Perineum with normal lochia noted. Lower extremities are negative for swelling, cords or tenderness. Lab/Data Review:  CBC:   Lab Results   Component Value Date/Time    WBC 10.0 2017 12:42 PM    HGB 10.5 (L) 2017 06:32 AM    HCT 31.7 (L) 2017 06:32 AM     2017 12:42 PM       Assessment and Plan:  Patient appears to be having uncomplicated post-partum course. Continue routine perineal care and maternal education. Plan discharge tomorrow if no problems occur.     Tameka Roberts CNM  2017  8:49 AM

## 2017-08-10 VITALS
WEIGHT: 182.98 LBS | RESPIRATION RATE: 18 BRPM | TEMPERATURE: 98 F | OXYGEN SATURATION: 99 % | DIASTOLIC BLOOD PRESSURE: 68 MMHG | BODY MASS INDEX: 32.42 KG/M2 | HEIGHT: 63 IN | SYSTOLIC BLOOD PRESSURE: 108 MMHG | HEART RATE: 68 BPM

## 2017-08-10 PROCEDURE — 75410000000 HC DELIVERY VAGINAL/SINGLE

## 2017-08-10 PROCEDURE — 75410000002 HC LABOR FEE PER 1 HR

## 2017-08-10 PROCEDURE — 75410000003 HC RECOV DEL/VAG/CSECN EA 0.5 HR

## 2017-08-10 PROCEDURE — 74011250637 HC RX REV CODE- 250/637: Performed by: ADVANCED PRACTICE MIDWIFE

## 2017-08-10 RX ADMIN — ACETAMINOPHEN 650 MG: 325 TABLET, FILM COATED ORAL at 09:39

## 2017-08-10 RX ADMIN — IBUPROFEN 800 MG: 400 TABLET ORAL at 12:35

## 2017-08-10 RX ADMIN — IBUPROFEN 800 MG: 400 TABLET ORAL at 03:39

## 2017-08-10 RX ADMIN — ACETAMINOPHEN 650 MG: 325 TABLET, FILM COATED ORAL at 00:03

## 2017-08-10 RX ADMIN — ACETAMINOPHEN 650 MG: 325 TABLET, FILM COATED ORAL at 04:16

## 2017-08-10 NOTE — LACTATION NOTE
Mother states baby is continuing to nurse well. No questions/concerns. Encouraged to call if needed.

## 2017-08-10 NOTE — DISCHARGE INSTRUCTIONS

## 2017-08-10 NOTE — ROUTINE PROCESS
Bedside and Verbal shift change report given to Sammy [address] (oncoming nurse) by [facility] RN   (offgoing nurse). Report included the following information SBAR, Procedure Summary, Intake/Output, MAR and Recent Results.

## 2017-08-10 NOTE — PROGRESS NOTES
~~ 0730 ASSUME CARE OF PT SITTING UP IN BED, SR UP X2, CB IN REACH, BABY SLEEPING IN CRIB, FOB SLEEPING- PT DENIES NEED FOR PAIN MED, INCREASED BLEEDING, NAUSEA, ITCHING OR DIFF VOIDING-  ASSESSMENT AS CHARTED. POC REVIEWED- WILL D/C THIS AM AFTER BABY HAS CIRC-    PT OFFERS NO C/O OR REQ    ~~0800 REG DIET BREAKFAST TRAY SERVED    ~~ 0815 BABY TO PROCEDURE ROOM FOR LISSETTE, DR WRIGHT HERE- REVIEWED  APPROX LENGTH OF SEPARATION.      ~~0925 BABY BACK TO ROOM SLEEPING SOUNDLY- CIRC TEACHING DEFERRED. REVIEWED THAT BABY NOT WANT TO NURSE FOR A WILE AFTER CIRC- PT INSTRUCTED NOT TO OPEN DIAPER W/O RN -- UNDERSTANDS. PT VOICES DESIRES FOR HOSP PICTURES    ~~0939 TYLENOL GIVEN FOR PAIN OF 3/10    ~~ 1015 PT GIVEN SAMPLE BAG & WRITTEN D/C INSTRUCTIONS FOR HER TO REVIEW--      ~~1030 PAN MED EFFECTIVE- PT GIVEN WRITTEN D/C INSTRUCTIONS FOR BABY TO READ    ~~1045 HUGS TAG REMOVED. BRACELETS CHECKED & FOOT PRINT SHEET SIGNED- PT HAS READ D/C INSTRUCTIONS-- ALL ? S ANSWERED. FOB ASKING IF PT CAN HAVE LUNCH BEFORE LEAVING-- HE ACTS AS LIKE THIS IS VERY IMPORTANT--  TOLD THAT IS FINE--    ~~1105 HOSP PHOTOG IN DOING PICTURES-      ~~1140  IN--    ~~1200 REG DIET LUNCH TRAY SERVED    ~~1230 CIRC TEACHING DONE W/ PT & FOB- D/C TEACHING DONE W/ PT & FOB. BOTH RECEPTIVE TO INFO. ALL ? S ANSWERED.     ~~1245 FUSSY BABY TO THE BREAST- GOOD LATCH & SUCK OBSERVED.     ~~1255 TEACHING COMPLETE--  PT FINISHING BIRTH CERT PAPER WORK- TOLD TO CALL FOR W/C WHEN READY TO EXIT--    ~~1330 PT & FOB STILL ORGANIZING TO LEAVE--    ~~1415 BABY INTO CAR SEAT BY PT/FOB.  PT D/C'D HOME VIA W/C IN GOOD COND W/O C/O OR REQ VOICED

## 2017-08-10 NOTE — DISCHARGE SUMMARY
Obstetrical Discharge Summary       RIANA Pascale Villalobos MD  [address] [facility]  [address] [place] [address] [phone]        Patient ID:  Jenny Isabel  [id number]  32 y.o.  1980    Admit Date: 2017    Discharge Date: 8/10/2017     Admitting Physician: Jeni Trinidad MD     Admission Diagnoses: maternity  Labor and delivery indication for care or intervention    Discharge Diagnoses: same as above      Additional Diagnoses:Present on Admission:   Elderly multigravida in third trimester   Labor and delivery indication for care or intervention       Historical Additional  Problem List.: Problem List as of 8/10/2017  Date Reviewed: 2017          Codes Class Noted - Resolved    Elderly multigravida in third trimester ICD-10-CM: O09.523  ICD-9-CM: 659.63  2017 - Present        * (Principal)Labor and delivery indication for care or intervention ICD-10-CM: O75.9  ICD-9-CM: 659.90  2017 - Present        RESOLVED: Postpartum care following vaginal delivery ICD-10-CM: Z39.2  ICD-9-CM: V24.2  2014 - 2017               Baby link  Information for the patient's :  Jcarlos Sexton [[id number]]     Delivery Type: Vaginal, Spontaneous Delivery   Delivery Date: 2017   Delivery Time: 2:52 PM     Birth Weight: 3.37 kg     Sex:  male  Delivery Clinician:  Janee Pemberton   Gestational Age: Gestational Age: 39w6d    Presentation: Vertex   Position: Right  Occiput  Anterior     Apgars were 2  and 9      Resuscitation Method: Tactile Stimulation; Oxygen;PPV;Suctioning-deep     Meconium Stained: None  Living Status: Living       Placenta Date/Time:   2:57 PM   Placenta Removal: Spontaneous   Placenta Appearance: Vertex [1]     Cord Information: 3 Vessels    Cord Events: None       Cord Blood Sent?:  Yes    Blood Gases Sent?:  Yes         Baby procedures:   Information for the patient's :  Jcarlos Sexton [[id number]]          Feeding Method: Infant Feeding: Breastmilk    Immunizations: There is no immunization history for the selected administration types on file for this patient. Group Beta Strep: GrBStrep, External   Date Value Ref Range Status   2017 Negative  Final        WBC   Date/Time Value Ref Range Status   2017 12:42 PM 10.0 4.6 - 13.2 K/uL Final   2014 11:50 PM 9.6 4.6 - 13.2 K/uL Final     HGB   Date/Time Value Ref Range Status   2017 06:32 AM 10.5 (L) 12.0 - 16.0 g/dL Final   2017 12:42 PM 11.2 (L) 12.0 - 16.0 g/dL Final   2014 05:25 AM 9.2 (L) 12.0 - 16.0 g/dL Final     PLATELET   Date/Time Value Ref Range Status   2017 12:42  135 - 420 K/uL Final       Hospital Course: Unremarkable    Patient is feeling well. Good pain control. Ambulating, voiding and eating well, no nausea or vomiting. On examination. Patient is in good general condition in no apparent distress    Patient Vitals for the past 8 hrs:   BP Temp Pulse Resp SpO2   08/10/17 0339 108/64 97.6 °F (36.4 °C) 60 16 99 %       Temp (24hrs), Av.5 °F (36.4 °C), Min:97.4 °F (36.3 °C), Max:97.8 °F (36.6 °C)            Prenatal Labs:   Lab Results   Component Value Date/Time    Rubella, External Immune 01/10/2017    GrBStrep, External Negative 2017    HBsAg, External Negative 01/10/2017    HIV, External Negative 01/10/2017    RPR, External Negative 01/10/2017    Gonorrhea, External Negative 01/10/2017    Chlamydia, External Negative 01/10/2017         Per the nursing assessment I am confirming:  Chest is clear to auscultation. Abdomen soft not tender. Bowel sounds are normal  Legs are normal without tenderness, swelling, or redness    Impression: OK for her to be discharged. Plan:   Discharge today with instructions for activity, medications, and what to call for. She has an appointment for six week.        Signed By: Cameron Mckay MD     August 10, 2017

## 2020-01-09 ENCOUNTER — HOSPITAL ENCOUNTER (OUTPATIENT)
Dept: LAB | Age: 40
Discharge: HOME | End: 2020-01-09
Payer: COMMERCIAL

## 2020-01-09 PROCEDURE — 87624 HPV HI-RISK TYP POOLED RSLT: CPT

## 2020-01-09 PROCEDURE — 88175 CYTOPATH C/V AUTO FLUID REDO: CPT

## 2022-02-17 ENCOUNTER — HOSPITAL ENCOUNTER (OUTPATIENT)
Dept: LAB | Age: 42
Discharge: HOME | End: 2022-02-17

## 2022-02-17 LAB — SENTARA SPECIMEN COL,SENBCF: NORMAL

## 2022-02-17 PROCEDURE — 99001 SPECIMEN HANDLING PT-LAB: CPT

## 2022-03-19 PROBLEM — O09.523 ELDERLY MULTIGRAVIDA IN THIRD TRIMESTER: Status: ACTIVE | Noted: 2017-08-08

## 2022-07-14 ENCOUNTER — APPOINTMENT (OUTPATIENT)
Dept: PHYSICAL THERAPY | Age: 42
End: 2022-07-14

## 2022-07-28 ENCOUNTER — APPOINTMENT (OUTPATIENT)
Dept: PHYSICAL THERAPY | Age: 42
End: 2022-07-28

## 2022-07-28 ENCOUNTER — APPOINTMENT (OUTPATIENT)
Dept: PHYSICAL THERAPY | Age: 42
End: 2022-07-28
Payer: COMMERCIAL

## 2022-07-28 ENCOUNTER — HOSPITAL ENCOUNTER (OUTPATIENT)
Dept: PHYSICAL THERAPY | Age: 42
Discharge: HOME | End: 2022-07-28
Payer: COMMERCIAL

## 2022-07-28 PROCEDURE — 97162 PT EVAL MOD COMPLEX 30 MIN: CPT

## 2022-07-28 PROCEDURE — 97110 THERAPEUTIC EXERCISES: CPT

## 2022-07-28 NOTE — PROGRESS NOTES
PELVIC FLOOR DAILY TREATMENT NOTE 8    Patient Name: Jewels Stroud  Date:2022  : 1980  [x]  Patient  Verified  Payor: Gabrielle Sessions / Plan: Neftaly Lo / Product Type: HMO /    In time:8:20  Out time:9:20  Total Treatment Time (min): 60  Total Timed Codes (min): 23  1:1 Treatment Time (min): 23   Visit #: 1 of 8    Treatment Area: Stress incontinence (female) (male) [N39.3]  Other low back pain [M54.59]    SUBJECTIVE  Pain Level (0-10 scale): 1  Any medication changes, allergies to medications, adverse drug reactions, diagnosis change, or new procedure performed?: [x] No    [] Yes (see summary sheet for update)  Subjective functional status/changes:   [] No changes reported  See POC    OBJECTIVE      Billed As:   [x] TE 23 min   [] TA   [] Neuro   [] Self Care Patient Education: [x] Review /POC/Goals    Educated Pt in pelvic floor anatomy, function/dysfunction and correct execution of a pelvic floor contraction. Educated in lower spine/SI anatomy and dysfunction. [] Progressed/Changed HEP based on:   [] positioning   [] body mechanics   [] transfers   [] heat/ice application    [] other:        Pain Level (0-10 scale) post treatment: 1/10    ASSESSMENT/Changes in Function:   Justification for Eval Code Complexity:  Patient History : See POC  Examination see exam   Clinical Presentation: evolving  Clinical Decision Making : FOTO : 53 /100    Patient will continue to benefit from skilled PT services to modify and progress therapeutic interventions, address strength deficits, analyze and address soft tissue restrictions, analyze and cue movement patterns, analyze and modify body mechanics/ergonomics, assess and modify postural abnormalities, instruct in home and community integration, and address IFRAH,fecal incontinence, dyspareunia  to attain remaining goals.      [x]  See Plan of Care  []  See progress note/recertification  []  See Discharge Summary         Progress towards goals / Updated goals:  Initial evaluation  and education initiated.        PLAN  [x]  Upgrade activities as tolerated     []  Continue plan of care  []  Update interventions per flow sheet       []  Discharge due to:_  []  Other:_      Claudio Davis PT 7/28/2022  9:20 AM      Future Appointments   Date Time Provider Johnathan Chapman   8/4/2022 11:15 AM Paty Pichardo, PT [facility]   8/11/2022  2:45 PM Paty Pichardo, PT [facility]   8/18/2022 11:15 AM Paty Pichardo, PT [facility]   8/25/2022 11:15 AM Paty Pichardo, PT [facility]   9/1/2022 11:15 AM Paty Pichardo, PT [facility]   9/8/2022 11:15 AM Paty Pichardo, PT [facility]   9/15/2022 11:15 AM Paty Pichardo, PT [facility]

## 2022-07-28 NOTE — PROGRESS NOTES
201 [facility] PHYSICAL THERAPY  [x]  At [facility] [address] [place] [address]. [address] [facility], [address] [facility] [address] - Phone: [phone] Fax: [phone] / 6[address] [facility]  Patient Name: Taylor Mccray : 1980   Medical   Diagnosis: Stress incontinence (female) (male) [N39.3]  Other low back pain [M54.59] Treatment Diagnosis: Stress incontinence (female) (male) [N39.3]  Other low back pain [M54.59]   Onset Date: 2021     Referral Source: Vianey Mckeon CNM [facility]): 2022   Prior Hospitalization: See medical history Provider #: [id number]   Prior Level of Function: Minimal UI since , pain free with ADLs   Comorbidities:    Medications: Verified on Patient Summary List   The Plan of Care and following information is based on the information from the initial evaluation.   ==================================================================================  Assessment / key information: Patient is a 40 y.o. yo female  with vaginal deliveries 2014, 2017 who presents to [facility] with diagnosis of Stress incontinence (female) (male) [N39.3]  Other low back pain [M54.59]. Her  pregnancy was complicated by hyperemesis gravidarum and excessive weight gain. Deliveries were uncomplicated except for tearing. She feels that her post partum recovery took 2 years after her 2017 pregnancy. Patient reports bilateral low back and hip pain worse on the right which is intermittent ranging from 1/10 to 5/10. Pain can be brought on by sit to stand, stair negotiation, lifting her 3year old and with lying on her stomach or back. It improves with sidelying. She has not had a spinal xray. She also reports IFRAH with running, jumping, laughing and sneezing and occasional fecal incontinence which is mucous. She has nocturia 2-3x nightly. She has incomplete bladder emptying.   She has increased urinary frequency at every 2 hours during the day. Patient wears pantishields for protection. Patient presents to PT with severely impaired strength of pelvic floor muscles scoring 2/3/4/10  on PERF. There was tenderness to palpation over bilateral iliopsoas muscles near the iliac crests and bilateral bulbocavernosus muscles and perineal scar in the perineum. She also presents with right SI joint hypomobility, impaired and painful trunk AROM in flexion and extension and impaired bilateral hip strength. There is a 2 finger diastasis recti present. Biofeedback to the pelvic floor muscles was deferred to first follow up secondary to time constraints. Patient scored 53 on FOTO/Urinary problem indicating decreased quality of life.   Patient can benefit from PT for retraining of muscle control on biofeedback, manual therapy, therapeutic exercises and education to increase pelvic floor muscle strength, ability to perform ADLs and ability to engage in sexual intercourse, decrease urinary incontinence, urgency and nocturia.    ==================================================================================  Eval Complexity: History: MEDIUM  Complexity : 1-2 comorbidities / personal factors will impact the outcome/ POC Exam:HIGH Complexity : 4+ Standardized tests and measures addressing body structure, function, activity limitation and / or participation in recreation  Presentation: MEDIUM Complexity : Evolving with changing characteristics  Clinical Decision Making:MEDIUM Complexity : FOTO score of 26-74Overall Complexity:MEDIUM  Problem List: Pelvic pain/dysfunction, Decreased pelvic floor mm awareness, Decreased pelvic floor mm strength, Improper voiding habits, Hypertonus of pelvic floor, Urinary urgency, and Other  Treatment Plan may include any combination of the following: Therapeutic exercise, Urge suppression techniques, Neuromuscular re-education, Manual therapy, Physical agent/modality, Patient education, and Other  Patient / Family readiness to learn indicated by: asking questions, trying to perform skills, and interest  Persons(s) to be included in education: patient (P)  Barriers to Learning/Limitations: None  Measures taken:    Patient Goal (s): \"Minimize pain, reduce leaks\"   Patient self reported health status: excellent  Rehabilitation Potential: good  Short Term Goals: To be accomplished in 4 weeks:   1. Patient performing pelvic floor exercises 3x day. 2. Patient will report 25% subjective improvement in urinary incontinence with activity such as laughing and sneezing. 3. Patient will increase net rise of fast twitch contraction by 5 microvolts to increase continence. 4. Patient will score +2 on GROC indicating back and hip pain to be a little better with ADLs. 5. Patient will increase bilateral hip strength to 4/5 all planes to facilitate ADLs such as sit to stand. Long Term Goals: To be accomplished in 8 weeks:   1. Patient independent in HEP     2. Patient will increase sore on FOTO/Urinary Problem to 61 indicating improved continence and quality of life. 3. Patient will increase net rise of fast twitch contraction by 10 microvolts to increase continence. 4. Patient will score +4 on GROC indicating back and hip pain to be moderately better with ADLs. 5. Patient will increase bilateral hip strength to 5/5 all planes to facilitate ADLs such as sit to stand. Frequency / Duration:   Patient to be seen  1-2  times per week for 8  weeks:  Patient / Caregiver education and instruction:    Therapist Signature: Lisa Burrell PT Date: 4/67/7161   Certification Period: na Time: 12:22 PM   ==================================================================================  I certify that the above Physical Therapy Services are being furnished while the patient is under my care. I agree with the treatment plan and certify that this therapy is necessary.   Physician Signature:        Date:       Time:     Please sign and return to [facility]. or you may fax the signed copy to [phone]. Thank you.     Vaughn Sharif CNM

## 2022-08-04 ENCOUNTER — HOSPITAL ENCOUNTER (OUTPATIENT)
Dept: PHYSICAL THERAPY | Age: 42
Discharge: HOME | End: 2022-08-04
Payer: COMMERCIAL

## 2022-08-04 PROCEDURE — 97140 MANUAL THERAPY 1/> REGIONS: CPT

## 2022-08-04 PROCEDURE — 97112 NEUROMUSCULAR REEDUCATION: CPT

## 2022-08-04 PROCEDURE — 97110 THERAPEUTIC EXERCISES: CPT

## 2022-08-04 NOTE — PROGRESS NOTES
PELVIC FLOOR DAILY TREATMENT NOTE  1-    Patient Name: Ab Esteban  Date:2022  : 1980  [x]  Patient  Verified  Payor: Tyrone Maria / Plan: Tapan Spivey / Product Type: HMO /    In time:11:25  Out time:12:15  Total Treatment Time (min): 50    (for [employer] and [employer] only)  Total Timed Codes (min): 40  1:1 Treatment Time (min): 40     Visit #: 2 of 8    Treatment Area: Stress incontinence (female) (male) [N39.3]  Other low back pain [M54.59]    SUBJECTIVE  Pain Level (0-10 scale): 0  Any medication changes, allergies to medications, adverse drug reactions, diagnosis change, or new procedure performed?: [x] No    [] Yes (see summary sheet for update)  Subjective functional status/changes:   [] No changes reported  Patient reports no problems after initial evaluation. Pain has been better over the last 2 weeks. OBJECTIVE  Modality rationale: Increase pelvic floor muscle strength, Improve quality of pelvic floor contractions, and decrease pain  in order to Increase urinary continence, Increase fecal continence, and Improve ability to perform ADLs.    Min Type Additional Details   20 [x] Biofeedback x 20 minutes    supine surface    [] Estim: []Att   []Unatt        []TENS instruct                  []IFC  []Premod   []NMES                     []Other:  []w/US   []w/ice   []w/heat  Position:  Location:    []  Traction: [] Cervical       []Lumbar                       [] Prone          []Supine                       []Intermittent   []Continuous Lbs:  [] before manual  [] after manual    []  Ultrasound: []Continuous   [] Pulsed                           []1MHz   []3MHz Location:  W/cm2:    []  Iontophoresis with dexamethasone         Location: [] Take home patch   [] In clinic   10 [x]  Ice     []  heat  []  Ice massage Position:supine  Location: Right SI/pelvic region1     []  Vasopneumatic Device Pressure:       [] lo [] med [] hi   Temperature: [] lo [] med [] hi   [x] Skin assessment post-treatment: [x]intact []redness- no adverse reaction       []redness - adverse reaction:      min Therapeutic Exercise:  [x] See flow sheet :  []  Pelvic floor strengthening                []  Pelvic floor downtraining  []  Quality pelvic floor contractions      []  Relaxation techniques  []  Urge suppression exercises  [x]  Other: Core strengthening   Rationale: Increase core strength in order to Increase urinary continence, Increase fecal continence, and Improve ability to perform ADLs. min Therapeutic Activity:  []  See flow sheet :   Rationale:  na  in order to  na . 10 min Manual Therapy: Corrected right anterior ileal rotation with MET. Release/DTM to bilateral iliopsoas muscles. Rationale: Increase tissue extensibility of the pelvic floor muscles, Inhibit abnormal muscle activity, and Improve lumbosacral and coccygeal mobility in order to Improve ability to perform ADLs. The manual therapy interventions were performed at a separate and distinct time from the therapeutic activities interventions. Billed as:   [x] TE 10 minutes   [] TA   [] Neuro   [] Self Care Patient Education: [x] Review HEP  Patient educated in correct execution of PPT but patient unable to complete the movement secondary to severe TA weakness. Educated in correct execution of a TA contraction and patient to perform 10 reps 1x day. [x] Progressed/Changed HEP based on: Pelvic floor HEP initiated with fast twitch contractions only 1x day seated, 2x day supine  [] positioning   [] body mechanics   [] transfers   [] heat/ice application    [] other:        Other Objective/Functional Measures:    [x]baseline resting tone: WNLs   [x]slow twitch mms 6.1(3.46) in supine. [x]fast twitch mms 14.8(8.63) . Pain Level (0-10 scale) post treatment: 0    ASSESSMENT/Changes in Function: Patient demonstrates impaired strength of pelvic floor muscles on biofeedback.     Patient will continue to benefit from skilled PT services to modify and progress therapeutic interventions, address strength deficits, analyze and address soft tissue restrictions, analyze and cue movement patterns, analyze and modify body mechanics/ergonomics, assess and modify postural abnormalities, instruct in home and community integration, and address IFRAH,fecal incontinence, dyspareunia  to attain remaining goals. []  See Plan of Care  []  See progress note/recertification  []  See Discharge Summary         Progress towards goals / Updated goals: First follow up since initial evaluation. Completed biofeedback to the pelvic floor muscles. 1. Patient performing pelvic floor exercises 3x day. 2. Patient will report 25% subjective improvement in urinary incontinence with activity such as laughing and sneezing. 3. Patient will increase net rise of fast twitch contraction by 5 microvolts to increase continence. 4. Patient will score +2 on GROC indicating back and hip pain to be a little better with ADLs. 5. Patient will increase bilateral hip strength to 4/5 all planes to facilitate ADLs such as sit to stand.     PLAN  [x]  Upgrade activities as tolerated     []  Continue plan of care  []  Update interventions per flow sheet       []  Discharge due to:_  []  Other:_      Raisa Figueroa PT 8/4/2022  12:15 PM      Future Appointments   Date Time Provider Johnathan Chapman   8/4/2022 11:15 AM Alfa Baker PT [facility]   8/11/2022  2:45 PM Alfa Baker PT [facility]   8/18/2022 11:15 AM Alfa Baker PT [facility]   8/25/2022 11:15 AM Alfa Baker PT [facility]   9/1/2022 11:15 AM Alfa Baker PT [facility]   9/8/2022 11:15 AM Alfa Baker PT [facility]   9/15/2022 11:15 AM Alfa Baker PT [facility]

## 2022-08-09 ENCOUNTER — TELEPHONE (OUTPATIENT)
Dept: PHYSICAL THERAPY | Age: 42
End: 2022-08-09

## 2022-08-11 ENCOUNTER — APPOINTMENT (OUTPATIENT)
Dept: PHYSICAL THERAPY | Age: 42
End: 2022-08-11
Payer: COMMERCIAL

## 2022-08-18 ENCOUNTER — APPOINTMENT (OUTPATIENT)
Dept: PHYSICAL THERAPY | Age: 42
End: 2022-08-18
Payer: COMMERCIAL

## 2022-08-25 ENCOUNTER — APPOINTMENT (OUTPATIENT)
Dept: PHYSICAL THERAPY | Age: 42
End: 2022-08-25

## 2022-08-25 ENCOUNTER — APPOINTMENT (OUTPATIENT)
Dept: PHYSICAL THERAPY | Age: 42
End: 2022-08-25
Payer: COMMERCIAL

## 2022-09-01 ENCOUNTER — APPOINTMENT (OUTPATIENT)
Dept: PHYSICAL THERAPY | Age: 42
End: 2022-09-01

## 2022-09-08 ENCOUNTER — APPOINTMENT (OUTPATIENT)
Dept: PHYSICAL THERAPY | Age: 42
End: 2022-09-08

## 2022-09-15 ENCOUNTER — APPOINTMENT (OUTPATIENT)
Dept: PHYSICAL THERAPY | Age: 42
End: 2022-09-15

## 2022-09-22 ENCOUNTER — APPOINTMENT (OUTPATIENT)
Dept: PHYSICAL THERAPY | Age: 42
End: 2022-09-22

## 2022-09-22 NOTE — THERAPY DISCHARGE
201 [facility] PHYSICAL THERAPY  [address] [place] [address] N[facility] [address],[place], [facility] - Phone: [phone]  Fax: [phone]    DISCHARGE NOTE  Patient Name: Marilyn Kitchen : 1980   Treatment/Medical Diagnosis: Stress incontinence (female) (male) [N39.3]  Other low back pain [M54.59]   Referral Source: Dalila Merlos CNM     Date of Initial Visit: 2022 Attended Visits: 2 Missed Visits: 5       SUMMARY OF TREATMENT  Pt attended only initial evaluation and     1     follow-ups and then did not return in part due to being out of town and then > 30 days since last seen. Therefore a formal reassessment of goals was not performed. RECOMMENDATIONS  Discontinue physical therapy due to patient not returning. If you have any questions/comments please contact us directly at [phone]. Thank you for allowing us to assist in the care of your patient. Therapist Signature:  Villa Pacheco PT Date: 2022     Time: 2:05 PM

## 2022-10-06 ENCOUNTER — APPOINTMENT (OUTPATIENT)
Dept: PHYSICAL THERAPY | Age: 42
End: 2022-10-06

## 2022-11-24 NOTE — LACTATION NOTE
Mom states baby has nursed well. Experienced mom, breast fed other child 9 months. Questions about pumping. Discussed pumping, storing, latch, nursing pattern and expectations, colostrum, milk coming in, hindmilk. Outpatient resources discussed. Offered help with feedings. Info sheet and daily log given, encouraged to call with questions. denies pain/discomfort

## 2023-04-13 ENCOUNTER — HOSPITAL ENCOUNTER (OUTPATIENT)
Facility: HOSPITAL | Age: 43
Setting detail: RECURRING SERIES
Discharge: HOME | End: 2023-04-16
Payer: COMMERCIAL

## 2023-04-13 PROCEDURE — 97162 PT EVAL MOD COMPLEX 30 MIN: CPT

## 2023-04-13 PROCEDURE — 97535 SELF CARE MNGMENT TRAINING: CPT

## 2023-04-20 ENCOUNTER — HOSPITAL ENCOUNTER (OUTPATIENT)
Facility: HOSPITAL | Age: 43
Setting detail: RECURRING SERIES
Discharge: HOME | End: 2023-04-23
Payer: COMMERCIAL

## 2023-04-20 PROCEDURE — 90913 BFB TRAINING EA ADDL 15 MIN: CPT

## 2023-04-20 PROCEDURE — 90912 BFB TRAINING 1ST 15 MIN: CPT

## 2023-04-20 PROCEDURE — 97110 THERAPEUTIC EXERCISES: CPT

## 2023-04-20 PROCEDURE — 97140 MANUAL THERAPY 1/> REGIONS: CPT

## 2023-04-27 ENCOUNTER — HOSPITAL ENCOUNTER (OUTPATIENT)
Facility: HOSPITAL | Age: 43
Setting detail: RECURRING SERIES
Discharge: HOME | End: 2023-04-30
Payer: COMMERCIAL

## 2023-04-27 PROCEDURE — 97110 THERAPEUTIC EXERCISES: CPT

## 2023-04-27 PROCEDURE — 90912 BFB TRAINING 1ST 15 MIN: CPT

## 2023-04-27 PROCEDURE — 97535 SELF CARE MNGMENT TRAINING: CPT

## 2023-04-27 PROCEDURE — 90913 BFB TRAINING EA ADDL 15 MIN: CPT

## 2023-05-04 ENCOUNTER — HOSPITAL ENCOUNTER (OUTPATIENT)
Facility: HOSPITAL | Age: 43
Setting detail: RECURRING SERIES
Discharge: HOME | End: 2023-05-07
Payer: COMMERCIAL

## 2023-05-04 PROCEDURE — 90912 BFB TRAINING 1ST 15 MIN: CPT

## 2023-05-04 PROCEDURE — 97110 THERAPEUTIC EXERCISES: CPT

## 2023-05-04 PROCEDURE — 90913 BFB TRAINING EA ADDL 15 MIN: CPT

## 2023-05-11 ENCOUNTER — HOSPITAL ENCOUNTER (OUTPATIENT)
Facility: HOSPITAL | Age: 43
Setting detail: RECURRING SERIES
Discharge: HOME | End: 2023-05-14
Payer: COMMERCIAL

## 2023-05-11 PROCEDURE — 90912 BFB TRAINING 1ST 15 MIN: CPT

## 2023-05-11 PROCEDURE — 97140 MANUAL THERAPY 1/> REGIONS: CPT

## 2023-05-11 PROCEDURE — 97535 SELF CARE MNGMENT TRAINING: CPT

## 2023-05-18 ENCOUNTER — TELEPHONE (OUTPATIENT)
Facility: HOSPITAL | Age: 43
End: 2023-05-18

## 2023-05-18 ENCOUNTER — HOSPITAL ENCOUNTER (OUTPATIENT)
Facility: HOSPITAL | Age: 43
Setting detail: RECURRING SERIES
End: 2023-05-18
Payer: COMMERCIAL

## 2023-05-18 NOTE — TELEPHONE ENCOUNTER
patient called apologized stating she needs to cancel PT today, stating she has to take her 5yr old to the ER.

## 2023-05-25 ENCOUNTER — HOSPITAL ENCOUNTER (OUTPATIENT)
Facility: HOSPITAL | Age: 43
Setting detail: RECURRING SERIES
Discharge: HOME | End: 2023-05-28
Payer: COMMERCIAL

## 2023-05-25 PROCEDURE — 90912 BFB TRAINING 1ST 15 MIN: CPT

## 2023-05-25 PROCEDURE — 97110 THERAPEUTIC EXERCISES: CPT

## 2023-06-01 ENCOUNTER — HOSPITAL ENCOUNTER (OUTPATIENT)
Facility: HOSPITAL | Age: 43
Setting detail: RECURRING SERIES
Discharge: HOME | End: 2023-06-04
Payer: COMMERCIAL

## 2023-06-01 PROCEDURE — 90912 BFB TRAINING 1ST 15 MIN: CPT

## 2023-06-01 PROCEDURE — 97110 THERAPEUTIC EXERCISES: CPT

## 2023-06-01 PROCEDURE — 97535 SELF CARE MNGMENT TRAINING: CPT

## 2023-06-15 ENCOUNTER — HOSPITAL ENCOUNTER (OUTPATIENT)
Facility: HOSPITAL | Age: 43
Setting detail: RECURRING SERIES
Discharge: HOME | End: 2023-06-18
Payer: COMMERCIAL

## 2023-06-15 PROCEDURE — 97110 THERAPEUTIC EXERCISES: CPT

## 2023-06-15 PROCEDURE — 90912 BFB TRAINING 1ST 15 MIN: CPT

## 2023-06-15 PROCEDURE — 97535 SELF CARE MNGMENT TRAINING: CPT
